# Patient Record
Sex: FEMALE | URBAN - METROPOLITAN AREA
[De-identification: names, ages, dates, MRNs, and addresses within clinical notes are randomized per-mention and may not be internally consistent; named-entity substitution may affect disease eponyms.]

---

## 2023-04-04 ENCOUNTER — APPOINTMENT (RX ONLY)
Dept: URBAN - METROPOLITAN AREA CLINIC 120 | Facility: CLINIC | Age: 73
Setting detail: DERMATOLOGY
End: 2023-04-04

## 2023-04-04 DIAGNOSIS — L81.4 OTHER MELANIN HYPERPIGMENTATION: ICD-10-CM

## 2023-04-04 DIAGNOSIS — L57.8 OTHER SKIN CHANGES DUE TO CHRONIC EXPOSURE TO NONIONIZING RADIATION: ICD-10-CM

## 2023-04-04 DIAGNOSIS — Z71.89 OTHER SPECIFIED COUNSELING: ICD-10-CM

## 2023-04-04 DIAGNOSIS — L82.1 OTHER SEBORRHEIC KERATOSIS: ICD-10-CM

## 2023-04-04 DIAGNOSIS — D22 MELANOCYTIC NEVI: ICD-10-CM

## 2023-04-04 DIAGNOSIS — L21.8 OTHER SEBORRHEIC DERMATITIS: ICD-10-CM

## 2023-04-04 DIAGNOSIS — D18.0 HEMANGIOMA: ICD-10-CM

## 2023-04-04 DIAGNOSIS — L81.5 LEUKODERMA, NOT ELSEWHERE CLASSIFIED: ICD-10-CM

## 2023-04-04 PROBLEM — D22.5 MELANOCYTIC NEVI OF TRUNK: Status: ACTIVE | Noted: 2023-04-04

## 2023-04-04 PROBLEM — D22.72 MELANOCYTIC NEVI OF LEFT LOWER LIMB, INCLUDING HIP: Status: ACTIVE | Noted: 2023-04-04

## 2023-04-04 PROBLEM — D18.01 HEMANGIOMA OF SKIN AND SUBCUTANEOUS TISSUE: Status: ACTIVE | Noted: 2023-04-04

## 2023-04-04 PROCEDURE — 99204 OFFICE O/P NEW MOD 45 MIN: CPT

## 2023-04-04 PROCEDURE — ? PRESCRIPTION

## 2023-04-04 PROCEDURE — ? PRESCRIPTION MEDICATION MANAGEMENT

## 2023-04-04 PROCEDURE — ? COUNSELING

## 2023-04-04 RX ORDER — FLUOCINONIDE 0.5 MG/ML
1 SOLUTION TOPICAL BID
Qty: 60 | Refills: 1 | Status: ERX | COMMUNITY
Start: 2023-04-04

## 2023-04-04 RX ADMIN — FLUOCINONIDE 1: 0.5 SOLUTION TOPICAL at 00:00

## 2023-04-04 ASSESSMENT — LOCATION SIMPLE DESCRIPTION DERM
LOCATION SIMPLE: GLABELLA
LOCATION SIMPLE: LEFT THIGH
LOCATION SIMPLE: RIGHT AXILLARY VAULT
LOCATION SIMPLE: ABDOMEN
LOCATION SIMPLE: CHEST
LOCATION SIMPLE: ANTERIOR SCALP
LOCATION SIMPLE: RIGHT THIGH
LOCATION SIMPLE: RIGHT UPPER BACK
LOCATION SIMPLE: LEFT CHEEK
LOCATION SIMPLE: RIGHT TEMPLE
LOCATION SIMPLE: LEFT TEMPLE
LOCATION SIMPLE: RIGHT PRETIBIAL REGION
LOCATION SIMPLE: LEFT BUTTOCK
LOCATION SIMPLE: LEFT UPPER BACK
LOCATION SIMPLE: LEFT POPLITEAL SKIN
LOCATION SIMPLE: LEFT CALF
LOCATION SIMPLE: LEFT POSTERIOR THIGH
LOCATION SIMPLE: LEFT FOREARM
LOCATION SIMPLE: RIGHT CALF
LOCATION SIMPLE: LEFT PRETIBIAL REGION
LOCATION SIMPLE: RIGHT BACK
LOCATION SIMPLE: RIGHT FOREARM
LOCATION SIMPLE: RIGHT POSTERIOR THIGH
LOCATION SIMPLE: RIGHT LIP

## 2023-04-04 ASSESSMENT — LOCATION ZONE DERM
LOCATION ZONE: LIP
LOCATION ZONE: FACE
LOCATION ZONE: SCALP
LOCATION ZONE: ARM
LOCATION ZONE: TRUNK
LOCATION ZONE: AXILLAE
LOCATION ZONE: LEG

## 2023-04-04 ASSESSMENT — LOCATION DETAILED DESCRIPTION DERM
LOCATION DETAILED: RIGHT MID TEMPLE
LOCATION DETAILED: RIGHT SUPERIOR LATERAL UPPER BACK
LOCATION DETAILED: RIGHT NASOLABIAL FOLD
LOCATION DETAILED: MID-FRONTAL SCALP
LOCATION DETAILED: RIGHT DISTAL POSTERIOR THIGH
LOCATION DETAILED: RIGHT LATERAL SUPERIOR CHEST
LOCATION DETAILED: RIGHT DISTAL CALF
LOCATION DETAILED: LEFT LATERAL ABDOMEN
LOCATION DETAILED: LEFT PROXIMAL DORSAL FOREARM
LOCATION DETAILED: MIDDLE STERNUM
LOCATION DETAILED: LEFT RIB CAGE
LOCATION DETAILED: RIGHT PROXIMAL DORSAL FOREARM
LOCATION DETAILED: LEFT PROXIMAL PRETIBIAL REGION
LOCATION DETAILED: RIGHT RIB CAGE
LOCATION DETAILED: LEFT MID TEMPLE
LOCATION DETAILED: LEFT DISTAL CALF
LOCATION DETAILED: RIGHT PROXIMAL PRETIBIAL REGION
LOCATION DETAILED: LEFT ANTERIOR PROXIMAL THIGH
LOCATION DETAILED: LEFT INFERIOR MEDIAL MALAR CHEEK
LOCATION DETAILED: LEFT MID-UPPER BACK
LOCATION DETAILED: RIGHT ANTERIOR DISTAL THIGH
LOCATION DETAILED: LEFT MEDIAL SUPERIOR CHEST
LOCATION DETAILED: LEFT SUPERIOR UPPER BACK
LOCATION DETAILED: RIGHT MEDIAL UPPER BACK
LOCATION DETAILED: RIGHT MID-UPPER BACK
LOCATION DETAILED: LEFT DISTAL POSTERIOR THIGH
LOCATION DETAILED: LEFT BUTTOCK
LOCATION DETAILED: LEFT POPLITEAL SKIN
LOCATION DETAILED: GLABELLA
LOCATION DETAILED: RIGHT AXILLARY VAULT

## 2023-04-04 NOTE — PROCEDURE: PRESCRIPTION MEDICATION MANAGEMENT
Detail Level: Zone
Initiate Treatment: For scalp only :Fluocinonide solution twice daily x 1 week, off x 1 week, repeat as needed
Render In Strict Bullet Format?: No

## 2023-04-04 NOTE — PROCEDURE: COUNSELING
Detail Level: Generalized
Detail Level: Zone
Detail Level: Simple
Cleanser Recommendations: Joesoefs bar soap or Nicolette JEN bar

## 2023-12-12 ENCOUNTER — COMPREHENSIVE EXAM (OUTPATIENT)
Facility: LOCATION | Age: 73
End: 2023-12-12

## 2023-12-12 DIAGNOSIS — H35.363: ICD-10-CM

## 2023-12-12 DIAGNOSIS — H02.886: ICD-10-CM

## 2023-12-12 DIAGNOSIS — H25.13: ICD-10-CM

## 2023-12-12 DIAGNOSIS — H35.09: ICD-10-CM

## 2023-12-12 DIAGNOSIS — H02.883: ICD-10-CM

## 2023-12-12 PROCEDURE — 92250 FUNDUS PHOTOGRAPHY W/I&R: CPT

## 2023-12-12 PROCEDURE — 92014 COMPRE OPH EXAM EST PT 1/>: CPT

## 2023-12-12 RX ORDER — MINERAL OIL 2; 2 MG/.4ML; MG/.4ML
1 EMULSION OPHTHALMIC
Start: 2023-12-12

## 2023-12-12 ASSESSMENT — KERATOMETRY
OD_AXISANGLE2_DEGREES: 75
OD_K1POWER_DIOPTERS: 45.75
OD_AXISANGLE_DEGREES: 165
OS_AXISANGLE2_DEGREES: 90
OS_K1POWER_DIOPTERS: 45.50
OD_K2POWER_DIOPTERS: 45.25
OS_AXISANGLE_DEGREES: 180
OS_K2POWER_DIOPTERS: 45.00

## 2023-12-12 ASSESSMENT — VISUAL ACUITY
OD_CC: 20/20-1
OD_CC: J1
OS_CC: J1
OS_PH: 20/25
OS_CC: 20/30-1

## 2023-12-12 ASSESSMENT — TONOMETRY
OD_IOP_MMHG: 16
OS_IOP_MMHG: 15

## 2024-01-08 ASSESSMENT — KERATOMETRY
OS_K2POWER_DIOPTERS: 45.00
OS_K1POWER_DIOPTERS: 45.50
OD_AXISANGLE_DEGREES: 165
OS_AXISANGLE_DEGREES: 180
OD_K2POWER_DIOPTERS: 45.25
OS_AXISANGLE2_DEGREES: 90
OD_AXISANGLE2_DEGREES: 75
OD_K1POWER_DIOPTERS: 45.75

## 2024-01-11 ENCOUNTER — FOLLOW UP (OUTPATIENT)
Facility: LOCATION | Age: 74
End: 2024-01-11

## 2024-01-11 DIAGNOSIS — H53.8: ICD-10-CM

## 2024-01-11 DIAGNOSIS — H35.09: ICD-10-CM

## 2024-01-11 DIAGNOSIS — H04.123: ICD-10-CM

## 2024-01-11 DIAGNOSIS — H35.363: ICD-10-CM

## 2024-01-11 DIAGNOSIS — H25.13: ICD-10-CM

## 2024-01-11 DIAGNOSIS — H02.883: ICD-10-CM

## 2024-01-11 PROCEDURE — 99213 OFFICE O/P EST LOW 20 MIN: CPT

## 2024-01-11 PROCEDURE — 92015 DETERMINE REFRACTIVE STATE: CPT

## 2024-01-11 ASSESSMENT — TONOMETRY
OS_IOP_MMHG: 13
OD_IOP_MMHG: 14

## 2024-01-11 ASSESSMENT — VISUAL ACUITY
OD_CC: 20/20-1
OD_SC: 20/40-2
OS_SC: J16
OS_SC: 20/50-3
OD_SC: J16
OS_CC: J1+
OS_CC: 20/25-2
OD_CC: J1+

## 2024-01-21 ASSESSMENT — KERATOMETRY
OD_K1POWER_DIOPTERS: 45.75
OS_K1POWER_DIOPTERS: 45.50
OD_AXISANGLE2_DEGREES: 75
OD_K2POWER_DIOPTERS: 45.25
OS_AXISANGLE_DEGREES: 180
OS_K2POWER_DIOPTERS: 45.00
OD_AXISANGLE_DEGREES: 165
OS_AXISANGLE2_DEGREES: 90

## 2024-01-22 ENCOUNTER — CONSULTATION/EVALUATION (OUTPATIENT)
Dept: URBAN - METROPOLITAN AREA CLINIC 36 | Facility: CLINIC | Age: 74
End: 2024-01-22

## 2024-01-22 DIAGNOSIS — H35.363: ICD-10-CM

## 2024-01-22 DIAGNOSIS — H25.813: ICD-10-CM

## 2024-01-22 DIAGNOSIS — H02.886: ICD-10-CM

## 2024-01-22 DIAGNOSIS — H01.003: ICD-10-CM

## 2024-01-22 DIAGNOSIS — H35.09: ICD-10-CM

## 2024-01-22 DIAGNOSIS — H02.883: ICD-10-CM

## 2024-01-22 DIAGNOSIS — H01.006: ICD-10-CM

## 2024-01-22 DIAGNOSIS — H04.123: ICD-10-CM

## 2024-01-22 PROCEDURE — 92136 OPHTHALMIC BIOMETRY: CPT

## 2024-01-22 PROCEDURE — 99214 OFFICE O/P EST MOD 30 MIN: CPT

## 2024-01-22 PROCEDURE — V2799PMN IMPRIMIS PRED-MOXI-NEPAF 5ML

## 2024-01-22 PROCEDURE — 92134 CPTRZ OPH DX IMG PST SGM RTA: CPT

## 2024-01-22 PROCEDURE — 92025 CPTRIZED CORNEAL TOPOGRAPHY: CPT

## 2024-01-22 ASSESSMENT — TONOMETRY
OS_IOP_MMHG: 14
OD_IOP_MMHG: 14

## 2024-01-22 ASSESSMENT — VISUAL ACUITY
OS_CC: 20/40-2
OD_SC: 20/50-1
OD_CC: 20/40+1
OS_SC: <J12
OD_SC: <J12
OS_CC: J1
OD_CC: J1
OS_SC: 20/50-1

## 2024-01-29 ENCOUNTER — CONSULTATION/EVALUATION (OUTPATIENT)
Dept: URBAN - METROPOLITAN AREA CLINIC 43 | Facility: CLINIC | Age: 74
End: 2024-01-29

## 2024-01-29 DIAGNOSIS — H35.09: ICD-10-CM

## 2024-01-29 PROCEDURE — 99199CT CLINICAL TRIAL VISIT

## 2024-01-29 ASSESSMENT — KERATOMETRY
OD_K2POWER_DIOPTERS: 45.25
OD_AXISANGLE_DEGREES: 165
OS_AXISANGLE_DEGREES: 180
OS_K1POWER_DIOPTERS: 45.50
OD_AXISANGLE2_DEGREES: 75
OD_K1POWER_DIOPTERS: 45.75
OS_K2POWER_DIOPTERS: 45.00
OS_AXISANGLE2_DEGREES: 90

## 2024-02-05 ASSESSMENT — KERATOMETRY
OD_K2POWER_DIOPTERS: 45.25
OS_K2POWER_DIOPTERS: 45.00
OD_AXISANGLE_DEGREES: 165
OS_K1POWER_DIOPTERS: 45.50
OS_AXISANGLE_DEGREES: 180
OD_AXISANGLE2_DEGREES: 75
OD_K1POWER_DIOPTERS: 45.75
OS_AXISANGLE2_DEGREES: 90

## 2024-02-06 ENCOUNTER — PRE-OP/H&P (OUTPATIENT)
Dept: URBAN - METROPOLITAN AREA SURGERY 14 | Facility: SURGERY | Age: 74
End: 2024-02-06

## 2024-02-06 ENCOUNTER — SURGERY/PROCEDURE (OUTPATIENT)
Facility: LOCATION | Age: 74
End: 2024-02-06

## 2024-02-06 DIAGNOSIS — H01.003: ICD-10-CM

## 2024-02-06 DIAGNOSIS — H25.813: ICD-10-CM

## 2024-02-06 DIAGNOSIS — H57.03: ICD-10-CM

## 2024-02-06 DIAGNOSIS — H35.363: ICD-10-CM

## 2024-02-06 DIAGNOSIS — H04.123: ICD-10-CM

## 2024-02-06 DIAGNOSIS — H02.886: ICD-10-CM

## 2024-02-06 DIAGNOSIS — H01.006: ICD-10-CM

## 2024-02-06 DIAGNOSIS — H35.09: ICD-10-CM

## 2024-02-06 DIAGNOSIS — H02.883: ICD-10-CM

## 2024-02-06 PROCEDURE — 66999LNSR LENSAR LASER FOR CAT SX

## 2024-02-06 PROCEDURE — 99211HP H&P OFFICE/OUTPATIENT VISIT, EST

## 2024-02-06 PROCEDURE — 66982AV COMPLEX CATARACT WITH ADVANCED IOL

## 2024-02-06 PROCEDURE — 65772LRI LRI DURING CAT SX

## 2024-02-07 ENCOUNTER — POST-OP (OUTPATIENT)
Facility: LOCATION | Age: 74
End: 2024-02-07

## 2024-02-07 DIAGNOSIS — Z96.1: ICD-10-CM

## 2024-02-07 PROCEDURE — 99024 POSTOP FOLLOW-UP VISIT: CPT

## 2024-02-07 ASSESSMENT — TONOMETRY
OD_IOP_MMHG: 18
OS_IOP_MMHG: 17

## 2024-02-07 ASSESSMENT — KERATOMETRY
OD_AXISANGLE2_DEGREES: 75
OS_AXISANGLE_DEGREES: 180
OD_K1POWER_DIOPTERS: 45.75
OS_AXISANGLE2_DEGREES: 90
OS_K1POWER_DIOPTERS: 45.50
OD_AXISANGLE_DEGREES: 165
OD_K2POWER_DIOPTERS: 45.25
OS_K2POWER_DIOPTERS: 45.00

## 2024-02-07 ASSESSMENT — VISUAL ACUITY
OS_SC: 20/80
OD_CC: J1+
OD_SC: 20/20
OS_CC: J1

## 2024-02-08 ASSESSMENT — KERATOMETRY
OD_K1POWER_DIOPTERS: 45.75
OS_K1POWER_DIOPTERS: 45.50
OS_AXISANGLE2_DEGREES: 90
OS_K2POWER_DIOPTERS: 45.00
OS_K2POWER_DIOPTERS: 45.00
OS_AXISANGLE2_DEGREES: 90
OD_AXISANGLE2_DEGREES: 75
OS_AXISANGLE_DEGREES: 180
OD_AXISANGLE_DEGREES: 165
OS_AXISANGLE_DEGREES: 180
OD_AXISANGLE2_DEGREES: 75
OD_K1POWER_DIOPTERS: 45.75
OD_K2POWER_DIOPTERS: 45.25
OS_K1POWER_DIOPTERS: 45.50
OD_AXISANGLE_DEGREES: 165
OD_K2POWER_DIOPTERS: 45.25

## 2024-02-09 ENCOUNTER — POST-OP (OUTPATIENT)
Dept: URBAN - METROPOLITAN AREA CLINIC 39 | Facility: CLINIC | Age: 74
End: 2024-02-09

## 2024-02-09 ENCOUNTER — SURGERY/PROCEDURE (OUTPATIENT)
Facility: LOCATION | Age: 74
End: 2024-02-09

## 2024-02-09 ENCOUNTER — PRE-OP/H&P (OUTPATIENT)
Facility: LOCATION | Age: 74
End: 2024-02-09

## 2024-02-09 DIAGNOSIS — H25.812: ICD-10-CM

## 2024-02-09 DIAGNOSIS — H57.03: ICD-10-CM

## 2024-02-09 DIAGNOSIS — Z96.1: ICD-10-CM

## 2024-02-09 DIAGNOSIS — H25.811: ICD-10-CM

## 2024-02-09 PROCEDURE — 66999LNSR LENSAR LASER FOR CAT SX

## 2024-02-09 PROCEDURE — 99211T TECH SERVICE

## 2024-02-09 PROCEDURE — 99211HP H&P OFFICE/OUTPATIENT VISIT, EST

## 2024-02-09 PROCEDURE — 66982AV COMPLEX CATARACT WITH ADVANCED IOL

## 2024-02-09 PROCEDURE — 65772LRI LRI DURING CAT SX

## 2024-02-09 ASSESSMENT — VISUAL ACUITY: OS_SC: 20/80

## 2024-02-09 ASSESSMENT — TONOMETRY: OS_IOP_MMHG: 18

## 2024-02-11 ASSESSMENT — KERATOMETRY
OS_AXISANGLE2_DEGREES: 90
OS_AXISANGLE_DEGREES: 180
OD_K2POWER_DIOPTERS: 45.25
OS_K1POWER_DIOPTERS: 45.50
OS_K2POWER_DIOPTERS: 45.00
OD_AXISANGLE2_DEGREES: 75
OD_K1POWER_DIOPTERS: 45.75
OD_AXISANGLE_DEGREES: 165

## 2024-02-13 ENCOUNTER — POST-OP (OUTPATIENT)
Facility: LOCATION | Age: 74
End: 2024-02-13

## 2024-02-13 DIAGNOSIS — Z96.1: ICD-10-CM

## 2024-02-13 PROCEDURE — 99024 POSTOP FOLLOW-UP VISIT: CPT

## 2024-02-13 ASSESSMENT — KERATOMETRY
OS_AXISANGLE2_DEGREES: 65
OS_AXISANGLE_DEGREES: 155
OD_AXISANGLE_DEGREES: 165
OD_K2POWER_DIOPTERS: 45.25
OS_K1POWER_DIOPTERS: 45.50
OD_AXISANGLE2_DEGREES: 75
OD_K1POWER_DIOPTERS: 45.25
OS_K2POWER_DIOPTERS: 46.00

## 2024-02-13 ASSESSMENT — VISUAL ACUITY
OS_SC: J5
OD_SC: 20/15
OS_SC: 20/20
OD_SC: J2

## 2024-02-13 ASSESSMENT — TONOMETRY
OS_IOP_MMHG: 16
OD_IOP_MMHG: 16

## 2024-02-14 ASSESSMENT — KERATOMETRY
OS_K2POWER_DIOPTERS: 45.00
OD_AXISANGLE2_DEGREES: 75
OS_AXISANGLE2_DEGREES: 90
OS_K1POWER_DIOPTERS: 45.50
OD_K2POWER_DIOPTERS: 45.25
OD_K1POWER_DIOPTERS: 45.75
OS_AXISANGLE_DEGREES: 180
OD_AXISANGLE_DEGREES: 165

## 2024-03-18 ASSESSMENT — KERATOMETRY
OD_AXISANGLE_DEGREES: 165
OD_K2POWER_DIOPTERS: 45.25
OS_K2POWER_DIOPTERS: 46.00
OD_K1POWER_DIOPTERS: 45.25
OS_AXISANGLE2_DEGREES: 65
OD_AXISANGLE2_DEGREES: 75
OS_K1POWER_DIOPTERS: 45.50
OS_AXISANGLE_DEGREES: 155

## 2024-03-19 ENCOUNTER — POST-OP (OUTPATIENT)
Facility: LOCATION | Age: 74
End: 2024-03-19

## 2024-03-19 DIAGNOSIS — Z96.1: ICD-10-CM

## 2024-03-19 PROCEDURE — 92015 DETERMINE REFRACTIVE STATE: CPT

## 2024-03-19 ASSESSMENT — VISUAL ACUITY
OD_SC: 20/20-1
OS_SC: 20/25-1

## 2024-03-19 ASSESSMENT — TONOMETRY
OD_IOP_MMHG: 12
OS_IOP_MMHG: 16

## 2024-04-15 ENCOUNTER — PREPPED CHART (OUTPATIENT)
Dept: URBAN - METROPOLITAN AREA CLINIC 36 | Facility: CLINIC | Age: 74
End: 2024-04-15

## 2024-05-15 ENCOUNTER — CLINICAL TRIAL VISIT (OUTPATIENT)
Dept: URBAN - METROPOLITAN AREA CLINIC 43 | Facility: CLINIC | Age: 74
End: 2024-05-15

## 2024-05-15 DIAGNOSIS — Z96.1: ICD-10-CM

## 2024-05-15 PROCEDURE — 99199CTKDS CLINICAL TRIAL VISIT PI SOLOMON

## 2024-08-06 ENCOUNTER — NON-APPOINTMENT (OUTPATIENT)
Age: 74
End: 2024-08-06

## 2025-01-17 ENCOUNTER — NON-APPOINTMENT (OUTPATIENT)
Age: 75
End: 2025-01-17

## 2025-02-12 ENCOUNTER — NON-APPOINTMENT (OUTPATIENT)
Age: 75
End: 2025-02-12

## 2025-02-26 ENCOUNTER — COMPREHENSIVE EXAM (OUTPATIENT)
Age: 75
End: 2025-02-26

## 2025-02-26 DIAGNOSIS — H02.882: ICD-10-CM

## 2025-02-26 DIAGNOSIS — H04.123: ICD-10-CM

## 2025-02-26 DIAGNOSIS — H35.09: ICD-10-CM

## 2025-02-26 DIAGNOSIS — H35.363: ICD-10-CM

## 2025-02-26 DIAGNOSIS — H02.885: ICD-10-CM

## 2025-02-26 PROCEDURE — 92014 COMPRE OPH EXAM EST PT 1/>: CPT
